# Patient Record
Sex: MALE | Race: OTHER | HISPANIC OR LATINO
[De-identification: names, ages, dates, MRNs, and addresses within clinical notes are randomized per-mention and may not be internally consistent; named-entity substitution may affect disease eponyms.]

---

## 2020-04-26 ENCOUNTER — MESSAGE (OUTPATIENT)
Age: 30
End: 2020-04-26

## 2021-09-20 PROBLEM — Z00.00 ENCOUNTER FOR PREVENTIVE HEALTH EXAMINATION: Status: ACTIVE | Noted: 2021-09-20

## 2021-12-28 ENCOUNTER — APPOINTMENT (OUTPATIENT)
Dept: PEDIATRICS | Facility: CLINIC | Age: 31
End: 2021-12-28
Payer: COMMERCIAL

## 2021-12-28 DIAGNOSIS — Z23 ENCOUNTER FOR IMMUNIZATION: ICD-10-CM

## 2021-12-28 PROCEDURE — 0004A: CPT

## 2021-12-28 NOTE — HISTORY OF PRESENT ILLNESS
[COVID-19] : COVID-19 [FreeTextEntry1] : Here for COVID booster vaccine\par Consent obtained and reviewed\par 0.3 mL vaccine administered in L arm\par Patient observed for 15 minutes following administration with no adverse effects noted\par \par

## 2022-02-11 ENCOUNTER — RESULT CHARGE (OUTPATIENT)
Age: 32
End: 2022-02-11

## 2022-04-22 ENCOUNTER — RESULT CHARGE (OUTPATIENT)
Age: 32
End: 2022-04-22

## 2022-04-22 DIAGNOSIS — Z20.822 CONTACT WITH AND (SUSPECTED) EXPOSURE TO COVID-19: ICD-10-CM

## 2022-04-22 LAB — SARS-COV-2 AG RESP QL IA.RAPID: NEGATIVE
